# Patient Record
Sex: FEMALE | Race: WHITE
[De-identification: names, ages, dates, MRNs, and addresses within clinical notes are randomized per-mention and may not be internally consistent; named-entity substitution may affect disease eponyms.]

---

## 2017-01-18 PROBLEM — K90.89 BILE SALT-INDUCED DIARRHEA: Status: ACTIVE | Noted: 2017-01-18

## 2017-01-22 ENCOUNTER — HOSPITAL (OUTPATIENT)
Dept: OTHER | Age: 27
End: 2017-01-22
Attending: INTERNAL MEDICINE

## 2017-04-28 PROCEDURE — 87045 FECES CULTURE AEROBIC BACT: CPT | Performed by: INTERNAL MEDICINE

## 2017-04-28 PROCEDURE — 87269 GIARDIA AG IF: CPT | Performed by: INTERNAL MEDICINE

## 2017-04-28 PROCEDURE — 87015 SPECIMEN INFECT AGNT CONCNTJ: CPT | Performed by: INTERNAL MEDICINE

## 2017-04-28 PROCEDURE — 83993 ASSAY FOR CALPROTECTIN FECAL: CPT | Performed by: INTERNAL MEDICINE

## 2017-04-28 PROCEDURE — 87209 SMEAR COMPLEX STAIN: CPT | Performed by: INTERNAL MEDICINE

## 2017-04-28 PROCEDURE — 87493 C DIFF AMPLIFIED PROBE: CPT | Performed by: INTERNAL MEDICINE

## 2017-04-28 PROCEDURE — 87046 STOOL CULTR AEROBIC BACT EA: CPT | Performed by: INTERNAL MEDICINE

## 2017-04-28 PROCEDURE — 87427 SHIGA-LIKE TOXIN AG IA: CPT | Performed by: INTERNAL MEDICINE

## 2017-04-28 PROCEDURE — 87177 OVA AND PARASITES SMEARS: CPT | Performed by: INTERNAL MEDICINE

## 2017-06-22 ENCOUNTER — HOSPITAL ENCOUNTER (OUTPATIENT)
Dept: ULTRASOUND IMAGING | Facility: HOSPITAL | Age: 27
Discharge: HOME | End: 2017-06-22
Attending: OBSTETRICS & GYNECOLOGY
Payer: COMMERCIAL

## 2017-06-22 DIAGNOSIS — N94.5 SECONDARY DYSMENORRHEA: ICD-10-CM

## 2017-06-22 PROCEDURE — 76856 US EXAM PELVIC COMPLETE: CPT | Performed by: OBSTETRICS & GYNECOLOGY

## 2017-06-22 PROCEDURE — 76830 TRANSVAGINAL US NON-OB: CPT | Performed by: OBSTETRICS & GYNECOLOGY

## 2017-08-24 PROCEDURE — 87086 URINE CULTURE/COLONY COUNT: CPT | Performed by: PHYSICIAN ASSISTANT

## 2017-08-28 PROBLEM — K58.0 IRRITABLE BOWEL SYNDROME WITH DIARRHEA: Status: ACTIVE | Noted: 2017-08-28

## 2017-09-13 PROCEDURE — 87086 URINE CULTURE/COLONY COUNT: CPT | Performed by: FAMILY MEDICINE

## 2017-09-13 PROCEDURE — 81001 URINALYSIS AUTO W/SCOPE: CPT | Performed by: FAMILY MEDICINE

## 2019-04-30 PROCEDURE — 86900 BLOOD TYPING SEROLOGIC ABO: CPT | Performed by: OBSTETRICS & GYNECOLOGY

## 2019-04-30 PROCEDURE — 86901 BLOOD TYPING SEROLOGIC RH(D): CPT | Performed by: OBSTETRICS & GYNECOLOGY

## 2020-04-30 ENCOUNTER — HOSPITAL ENCOUNTER (INPATIENT)
Facility: HOSPITAL | Age: 30
LOS: 2 days | Discharge: HOME | End: 2020-05-02
Attending: OBSTETRICS & GYNECOLOGY | Admitting: OBSTETRICS & GYNECOLOGY
Payer: COMMERCIAL

## 2020-04-30 PROBLEM — O47.9 IRREGULAR CONTRACTIONS: Status: ACTIVE | Noted: 2020-04-30

## 2020-04-30 PROBLEM — Z34.90 TERM PREGNANCY: Status: ACTIVE | Noted: 2020-04-30

## 2020-04-30 PROCEDURE — 86901 BLOOD TYPING SEROLOGIC RH(D): CPT | Performed by: OBSTETRICS & GYNECOLOGY

## 2020-04-30 PROCEDURE — 85027 COMPLETE CBC AUTOMATED: CPT | Performed by: OBSTETRICS & GYNECOLOGY

## 2020-04-30 PROCEDURE — 86850 RBC ANTIBODY SCREEN: CPT | Performed by: OBSTETRICS & GYNECOLOGY

## 2020-04-30 PROCEDURE — 99214 OFFICE O/P EST MOD 30 MIN: CPT

## 2020-04-30 PROCEDURE — 86900 BLOOD TYPING SEROLOGIC ABO: CPT | Performed by: OBSTETRICS & GYNECOLOGY

## 2020-04-30 PROCEDURE — 0HQ9XZZ REPAIR PERINEUM SKIN, EXTERNAL APPROACH: ICD-10-PCS | Performed by: OBSTETRICS & GYNECOLOGY

## 2020-04-30 RX ORDER — DEXTROSE, SODIUM CHLORIDE, SODIUM LACTATE, POTASSIUM CHLORIDE, AND CALCIUM CHLORIDE 5; .6; .31; .03; .02 G/100ML; G/100ML; G/100ML; G/100ML; G/100ML
INJECTION, SOLUTION INTRAVENOUS AS NEEDED
Status: DISCONTINUED | OUTPATIENT
Start: 2020-04-30 | End: 2020-05-01 | Stop reason: HOSPADM

## 2020-04-30 RX ORDER — EPHEDRINE SULFATE/0.9% NACL/PF 25 MG/5 ML
5 SYRINGE (ML) INTRAVENOUS AS NEEDED
Status: DISCONTINUED | OUTPATIENT
Start: 2020-04-30 | End: 2020-05-01

## 2020-04-30 RX ORDER — SODIUM CHLORIDE 0.9 % (FLUSH) 0.9 %
2 SYRINGE (ML) INJECTION AS NEEDED
Status: DISCONTINUED | OUTPATIENT
Start: 2020-04-30 | End: 2020-05-01

## 2020-04-30 RX ORDER — AMMONIA INHALANTS 0.04 G/.3ML
0.3 INHALANT RESPIRATORY (INHALATION) AS NEEDED
Status: DISCONTINUED | OUTPATIENT
Start: 2020-04-30 | End: 2020-05-01 | Stop reason: HOSPADM

## 2020-04-30 RX ORDER — SODIUM CHLORIDE 0.9 % (FLUSH) 0.9 %
10 SYRINGE (ML) INJECTION AS NEEDED
Status: DISCONTINUED | OUTPATIENT
Start: 2020-04-30 | End: 2020-05-01 | Stop reason: HOSPADM

## 2020-04-30 RX ORDER — SODIUM CHLORIDE, SODIUM LACTATE, POTASSIUM CHLORIDE, CALCIUM CHLORIDE 600; 310; 30; 20 MG/100ML; MG/100ML; MG/100ML; MG/100ML
INJECTION, SOLUTION INTRAVENOUS CONTINUOUS
Status: DISCONTINUED | OUTPATIENT
Start: 2020-04-30 | End: 2020-05-01

## 2020-04-30 RX ORDER — DIPHENHYDRAMINE HYDROCHLORIDE 50 MG/ML
12.5 INJECTION INTRAMUSCULAR; INTRAVENOUS EVERY 4 HOURS PRN
Status: DISCONTINUED | OUTPATIENT
Start: 2020-04-30 | End: 2020-05-01

## 2020-04-30 RX ORDER — IBUPROFEN 600 MG/1
600 TABLET ORAL ONCE AS NEEDED
Status: DISCONTINUED | OUTPATIENT
Start: 2020-04-30 | End: 2020-05-01 | Stop reason: HOSPADM

## 2020-04-30 RX ORDER — TERBUTALINE SULFATE 1 MG/ML
0.25 INJECTION, SOLUTION SUBCUTANEOUS AS NEEDED
Status: DISCONTINUED | OUTPATIENT
Start: 2020-04-30 | End: 2020-05-01 | Stop reason: HOSPADM

## 2020-04-30 RX ORDER — ACETAMINOPHEN 500 MG
500 TABLET ORAL ONCE AS NEEDED
Status: DISCONTINUED | OUTPATIENT
Start: 2020-04-30 | End: 2020-05-01 | Stop reason: HOSPADM

## 2020-04-30 RX ORDER — BUPIVACAINE HYDROCHLORIDE 2.5 MG/ML
10 INJECTION, SOLUTION EPIDURAL; INFILTRATION; INTRACAUDAL ONCE
Status: DISCONTINUED | OUTPATIENT
Start: 2020-04-30 | End: 2020-05-01

## 2020-04-30 RX ORDER — PRENATAL VIT/IRON FUM/FOLIC AC 27 MG-1 MG
1 TABLET ORAL DAILY
COMMUNITY

## 2020-04-30 RX ORDER — TRISODIUM CITRATE DIHYDRATE AND CITRIC ACID MONOHYDRATE 500; 334 MG/5ML; MG/5ML
30 SOLUTION ORAL AS NEEDED
Status: DISCONTINUED | OUTPATIENT
Start: 2020-04-30 | End: 2020-05-01 | Stop reason: HOSPADM

## 2020-04-30 RX ORDER — LIDOCAINE HYDROCHLORIDE 10 MG/ML
30 INJECTION, SOLUTION EPIDURAL; INFILTRATION; INTRACAUDAL; PERINEURAL ONCE
Status: DISCONTINUED | OUTPATIENT
Start: 2020-04-30 | End: 2020-05-01 | Stop reason: HOSPADM

## 2020-04-30 RX ORDER — SODIUM CHLORIDE 0.9 % (FLUSH) 0.9 %
2 SYRINGE (ML) INJECTION EVERY 8 HOURS
Status: DISCONTINUED | OUTPATIENT
Start: 2020-04-30 | End: 2020-05-01

## 2020-04-30 RX ORDER — SODIUM CHLORIDE, SODIUM LACTATE, POTASSIUM CHLORIDE, CALCIUM CHLORIDE 600; 310; 30; 20 MG/100ML; MG/100ML; MG/100ML; MG/100ML
INJECTION, SOLUTION INTRAVENOUS CONTINUOUS
Status: DISCONTINUED | OUTPATIENT
Start: 2020-04-30 | End: 2020-05-01 | Stop reason: HOSPADM

## 2020-04-30 RX ORDER — ONDANSETRON 2 MG/ML
4 INJECTION INTRAMUSCULAR; INTRAVENOUS EVERY 6 HOURS PRN
Status: DISCONTINUED | OUTPATIENT
Start: 2020-04-30 | End: 2020-05-01

## 2020-04-30 RX ORDER — ONDANSETRON 2 MG/ML
INJECTION INTRAMUSCULAR; INTRAVENOUS
Status: COMPLETED
Start: 2020-04-30 | End: 2020-04-30

## 2020-04-30 NOTE — PROGRESS NOTES
Pt is a 30year old female admitted to TR3/TR3-A. Patient presents with:  Contractions     Pt is  39w6d intra-uterine pregnancy. History obtained, consents signed. Oriented to room, staff, and plan of care.

## 2020-05-01 PROCEDURE — 85025 COMPLETE CBC W/AUTO DIFF WBC: CPT | Performed by: OBSTETRICS & GYNECOLOGY

## 2020-05-01 RX ORDER — SODIUM CHLORIDE 0.9 % (FLUSH) 0.9 %
10 SYRINGE (ML) INJECTION AS NEEDED
Status: DISCONTINUED | OUTPATIENT
Start: 2020-05-01 | End: 2020-05-02

## 2020-05-01 RX ORDER — ACETAMINOPHEN 325 MG/1
650 TABLET ORAL EVERY 6 HOURS PRN
Status: DISCONTINUED | OUTPATIENT
Start: 2020-05-01 | End: 2020-05-02

## 2020-05-01 RX ORDER — SIMETHICONE 80 MG
80 TABLET,CHEWABLE ORAL 3 TIMES DAILY PRN
Status: DISCONTINUED | OUTPATIENT
Start: 2020-05-01 | End: 2020-05-02

## 2020-05-01 RX ORDER — DOCUSATE SODIUM 100 MG/1
100 CAPSULE, LIQUID FILLED ORAL
Status: DISCONTINUED | OUTPATIENT
Start: 2020-05-01 | End: 2020-05-02

## 2020-05-01 RX ORDER — IBUPROFEN 600 MG/1
600 TABLET ORAL EVERY 6 HOURS
Status: DISCONTINUED | OUTPATIENT
Start: 2020-05-01 | End: 2020-05-02

## 2020-05-01 RX ORDER — ONDANSETRON 2 MG/ML
4 INJECTION INTRAMUSCULAR; INTRAVENOUS EVERY 6 HOURS PRN
Status: DISCONTINUED | OUTPATIENT
Start: 2020-05-01 | End: 2020-05-02

## 2020-05-01 RX ORDER — DIAPER,BRIEF,INFANT-TODD,DISP
1 EACH MISCELLANEOUS EVERY 6 HOURS PRN
Status: DISCONTINUED | OUTPATIENT
Start: 2020-05-01 | End: 2020-05-02

## 2020-05-01 RX ORDER — AMMONIA INHALANTS 0.04 G/.3ML
0.3 INHALANT RESPIRATORY (INHALATION) AS NEEDED
Status: DISCONTINUED | OUTPATIENT
Start: 2020-05-01 | End: 2020-05-02

## 2020-05-01 RX ORDER — CHOLECALCIFEROL (VITAMIN D3) 25 MCG
1 TABLET,CHEWABLE ORAL DAILY
Status: DISCONTINUED | OUTPATIENT
Start: 2020-05-01 | End: 2020-05-02

## 2020-05-01 RX ORDER — BISACODYL 10 MG
10 SUPPOSITORY, RECTAL RECTAL ONCE AS NEEDED
Status: DISCONTINUED | OUTPATIENT
Start: 2020-05-01 | End: 2020-05-02

## 2020-05-01 NOTE — PROGRESS NOTES
Post-Partum Note   5/1/2020, 11:22 AM    Subjective:  PPD #1  No complaints. Lochia normal. Voiding normal. Not dizzy. Mood good.      Breast     Objective:   05/01/20  0030 05/01/20  0230 05/01/20  0625 05/01/20  0900   BP: 121/69 114/58 105/65 105/56   BP

## 2020-05-01 NOTE — LACTATION NOTE
LACTATION NOTE - MOTHER      Evaluation Type: Inpatient    Problems identified  Problems identified: Knowledge deficit    Maternal history  Maternal history: Anxiety;Depression  Other/comment: IBS, biliary dyskinesia, asthma, COPD    Breastfeeding goal  Br

## 2020-05-01 NOTE — H&P
[address] Patient Status:  Outpatient    1990 MRN [id number]   Location [address] [place] [address] Attending Pascual Vidal MD   Hosp Day # 0 PCP Shon De Jesus MD     Date cm    Effacement: 50 %    Station: -3    Position: Cephalic    Dominique score: 0    FHT assessment:   Baseline: 120 bpm   Variability: moderate   Accels:  Yes   Decels: lates -- episodic   Tocos:  q 2-4   Category: 2 tracing    Results:                Assess

## 2020-05-01 NOTE — DISCHARGE SUMMARY
[facility]    Discharge Summary    Tino Pearson Patient Status:  Outpatient    1990 MRN [id number]   Location [address]  Attending Enid Martin MD   [facility] Day # 2       Admission Date: 2020

## 2020-05-01 NOTE — PROGRESS NOTES
VSS. Lochia and fundus within normal limits. Unable to void before transfer. Bladder scanned 300 in bladder. Drinking PO fluids. Instructed to use call light to ask for assistance before getting out of bed. Oriented to room and unit. Breast feeding.  Discus

## 2020-05-01 NOTE — PLAN OF CARE
Problem: Patient/Family Goals  Goal: Patient/Family Long Term Goal  Description  Patient's Long Term Goal:     Interventions:  -   - See additional Care Plan goals for specific interventions  Outcome: Progressing  Goal: Patient/Family Short Term Goal  Margarita Brady efforts. - Assess support systems available to mother/family.  - Identify cultural beliefs/practices regarding lactation, letdown techniques, maternal food preferences.   - Assess mother's knowledge and previous experience with breast feeding.  - Provide i /case management support as needed.   Outcome: Progressing

## 2020-05-01 NOTE — L&D DELIVERY NOTE
[facility] HOSP - [facility]    Vaginal Delivery Note    Jil Grubbs Patient Status:  Outpatient    1990 MRN [id number]   Location [address]  Attending Meryle Raveling, MD   Hosp Day # 0 PCP MD Therese Vizcarra

## 2020-05-01 NOTE — LACTATION NOTE
Zahira Mccarthy LACTATION NOTE - MOTHER      Evaluation Type: Inpatient    Problems identified  Problems identified: Knowledge deficit; Nipple pain; Nipple trauma    Maternal history  Maternal history: Anxiety;Depression  Other/comment: IBS, biliary dyskinesia, asthma,

## 2020-05-01 NOTE — PROGRESS NOTES
Patient up to bathroom with assist x 2. Unable to void at this time. Patient transferred to mother/baby room 349 per wheelchair in stable condition with baby and personal belongings. Accompanied by significant other and staff.   Report given to mother/baby

## 2020-05-01 NOTE — PROGRESS NOTES
[facility]    Labor Progress Note    Stephanie Vazquez Patient Status:  Outpatient    1990 MRN [id number]   Location [address] [facility] Attending Celia Rivera MD   Hosp Day # 0 PCP MD Israel Bains

## 2020-05-02 VITALS
RESPIRATION RATE: 16 BRPM | WEIGHT: 155 LBS | BODY MASS INDEX: 27.46 KG/M2 | SYSTOLIC BLOOD PRESSURE: 119 MMHG | TEMPERATURE: 98 F | HEART RATE: 97 BPM | HEIGHT: 63 IN | DIASTOLIC BLOOD PRESSURE: 78 MMHG

## 2020-05-02 PROBLEM — Z86.59 HISTORY OF ANXIETY: Status: ACTIVE | Noted: 2020-05-02

## 2020-05-02 NOTE — PROGRESS NOTES
Post-Partum Note   5/2/2020, 1:01 PM    Subjective:  PPD 2  No complaints. Lochia normal. Voiding normal. Not dizzy. Mood good.        Objective:   05/01/20  0900 05/01/20  1828 05/01/20  2315 05/02/20  1000   BP: 105/56 104/73 105/67 119/78   BP Location:

## 2020-05-05 ENCOUNTER — TELEPHONE (OUTPATIENT)
Dept: LACTATION | Facility: HOSPITAL | Age: 30
End: 2020-05-05

## 2020-05-05 NOTE — TELEPHONE ENCOUNTER
This note is being written on 5/520; original note from 5/3/20 1830 was lost. Returned patient's call to lactation support services at ~ 1815. Patient reported she believed her breastmilk \"is in\" and her \"breasts are hard\" and wanted assistance.  Kamlesh

## 2020-05-06 ENCOUNTER — TELEPHONE (OUTPATIENT)
Dept: OBGYN UNIT | Facility: HOSPITAL | Age: 30
End: 2020-05-06

## 2020-05-09 ENCOUNTER — TELEPHONE (OUTPATIENT)
Dept: LACTATION | Facility: HOSPITAL | Age: 30
End: 2020-05-09

## 2020-05-09 ENCOUNTER — TELEPHONE (OUTPATIENT)
Dept: OBGYN UNIT | Facility: HOSPITAL | Age: 30
End: 2020-05-09

## 2021-12-01 ENCOUNTER — HOSPITAL ENCOUNTER (INPATIENT)
Facility: HOSPITAL | Age: 31
LOS: 2 days | Discharge: HOME | End: 2021-12-03
Attending: OBSTETRICS & GYNECOLOGY | Admitting: OBSTETRICS & GYNECOLOGY
Payer: COMMERCIAL

## 2021-12-01 PROBLEM — O42.90 PREMATURE RUPTURE OF MEMBRANES: Status: ACTIVE | Noted: 2021-12-01

## 2021-12-01 PROCEDURE — 86900 BLOOD TYPING SEROLOGIC ABO: CPT | Performed by: OBSTETRICS & GYNECOLOGY

## 2021-12-01 PROCEDURE — 86850 RBC ANTIBODY SCREEN: CPT | Performed by: OBSTETRICS & GYNECOLOGY

## 2021-12-01 PROCEDURE — 85025 COMPLETE CBC W/AUTO DIFF WBC: CPT | Performed by: OBSTETRICS & GYNECOLOGY

## 2021-12-01 PROCEDURE — 86901 BLOOD TYPING SEROLOGIC RH(D): CPT | Performed by: OBSTETRICS & GYNECOLOGY

## 2021-12-01 RX ORDER — ACETAMINOPHEN 500 MG
500 TABLET ORAL EVERY 6 HOURS PRN
Status: DISCONTINUED | OUTPATIENT
Start: 2021-12-01 | End: 2021-12-02

## 2021-12-01 RX ORDER — TERBUTALINE SULFATE 1 MG/ML
0.25 INJECTION, SOLUTION SUBCUTANEOUS AS NEEDED
Status: DISCONTINUED | OUTPATIENT
Start: 2021-12-01 | End: 2021-12-02

## 2021-12-01 RX ORDER — TRISODIUM CITRATE DIHYDRATE AND CITRIC ACID MONOHYDRATE 500; 334 MG/5ML; MG/5ML
30 SOLUTION ORAL AS NEEDED
Status: DISCONTINUED | OUTPATIENT
Start: 2021-12-01 | End: 2021-12-02

## 2021-12-01 RX ORDER — AMMONIA INHALANTS 0.04 G/.3ML
0.3 INHALANT RESPIRATORY (INHALATION) AS NEEDED
Status: DISCONTINUED | OUTPATIENT
Start: 2021-12-01 | End: 2021-12-02

## 2021-12-01 RX ORDER — IBUPROFEN 600 MG/1
600 TABLET ORAL EVERY 6 HOURS PRN
Status: DISCONTINUED | OUTPATIENT
Start: 2021-12-01 | End: 2021-12-02

## 2021-12-01 RX ORDER — SODIUM CHLORIDE, SODIUM LACTATE, POTASSIUM CHLORIDE, CALCIUM CHLORIDE 600; 310; 30; 20 MG/100ML; MG/100ML; MG/100ML; MG/100ML
INJECTION, SOLUTION INTRAVENOUS AS NEEDED
Status: DISCONTINUED | OUTPATIENT
Start: 2021-12-01 | End: 2021-12-02

## 2021-12-01 RX ORDER — ONDANSETRON 2 MG/ML
4 INJECTION INTRAMUSCULAR; INTRAVENOUS EVERY 6 HOURS PRN
Status: DISCONTINUED | OUTPATIENT
Start: 2021-12-01 | End: 2021-12-02

## 2021-12-01 RX ORDER — MELATONIN
325
COMMUNITY
End: 2021-12-03

## 2021-12-01 RX ORDER — DEXTROSE, SODIUM CHLORIDE, SODIUM LACTATE, POTASSIUM CHLORIDE, AND CALCIUM CHLORIDE 5; .6; .31; .03; .02 G/100ML; G/100ML; G/100ML; G/100ML; G/100ML
INJECTION, SOLUTION INTRAVENOUS CONTINUOUS
Status: DISCONTINUED | OUTPATIENT
Start: 2021-12-01 | End: 2021-12-02

## 2021-12-01 RX ORDER — LIDOCAINE HYDROCHLORIDE 10 MG/ML
30 INJECTION, SOLUTION EPIDURAL; INFILTRATION; INTRACAUDAL; PERINEURAL ONCE
Status: DISCONTINUED | OUTPATIENT
Start: 2021-12-01 | End: 2021-12-02

## 2021-12-02 PROCEDURE — 0KQM0ZZ REPAIR PERINEUM MUSCLE, OPEN APPROACH: ICD-10-PCS | Performed by: OBSTETRICS & GYNECOLOGY

## 2021-12-02 RX ORDER — BISACODYL 10 MG
10 SUPPOSITORY, RECTAL RECTAL ONCE AS NEEDED
Status: DISCONTINUED | OUTPATIENT
Start: 2021-12-02 | End: 2021-12-03

## 2021-12-02 RX ORDER — ONDANSETRON 2 MG/ML
4 INJECTION INTRAMUSCULAR; INTRAVENOUS EVERY 6 HOURS PRN
Status: DISCONTINUED | OUTPATIENT
Start: 2021-12-02 | End: 2021-12-03

## 2021-12-02 RX ORDER — SIMETHICONE 80 MG
80 TABLET,CHEWABLE ORAL 3 TIMES DAILY PRN
Status: DISCONTINUED | OUTPATIENT
Start: 2021-12-02 | End: 2021-12-03

## 2021-12-02 RX ORDER — DOCUSATE SODIUM 100 MG/1
100 CAPSULE, LIQUID FILLED ORAL 2 TIMES DAILY
Status: DISCONTINUED | OUTPATIENT
Start: 2021-12-02 | End: 2021-12-03

## 2021-12-02 RX ORDER — IBUPROFEN 600 MG/1
600 TABLET ORAL EVERY 6 HOURS
Status: DISCONTINUED | OUTPATIENT
Start: 2021-12-02 | End: 2021-12-03

## 2021-12-02 RX ORDER — DIAPER,BRIEF,INFANT-TODD,DISP
1 EACH MISCELLANEOUS EVERY 6 HOURS PRN
Status: DISCONTINUED | OUTPATIENT
Start: 2021-12-02 | End: 2021-12-03

## 2021-12-02 RX ORDER — ACETAMINOPHEN 325 MG/1
650 TABLET ORAL EVERY 6 HOURS PRN
Status: DISCONTINUED | OUTPATIENT
Start: 2021-12-02 | End: 2021-12-03

## 2021-12-02 RX ORDER — AMMONIA INHALANTS 0.04 G/.3ML
0.3 INHALANT RESPIRATORY (INHALATION) AS NEEDED
Status: DISCONTINUED | OUTPATIENT
Start: 2021-12-02 | End: 2021-12-03

## 2021-12-02 NOTE — PROGRESS NOTES
Labor Progress Note    Pt feeling more pressure and discomfort s/p Pit AOL started due to lack of change and UCs less freq/strong    AFVSS  Exam deferred    FHTs mod variability, variable decels w UCs  UC q 2-3 min w Pit @ 6 mU/min    CPM    SHAY GAGE,

## 2021-12-02 NOTE — H&P
[address] [place] Patient Status:  Inpatient    1990 MRN [id number]   Location [facility] Attending Jhoan Gutierrez MD   Hosp Day # 1 PCP Katie Deleon MD     Date of A [id number];  Surgeon: Aki Lee MD;  Location: [address] [place] [address]     Family History: No family history on file.  Noncontrib  Social History: Social History    Tobacco Use      Smoking status: Never Smoker      Smokeless tobacco: Never Used    Alco 11/23/2021 Negative Negative Final   B+, Ab scrn neg, Rub-Imm, HBsAg NR, 11/23 HIV and RPR neg.        Lab brief 24hr:  Lab Results   Component Value Date    WBC 10.8 12/01/2021    HGB 11.7 12/01/2021    HCT 34.8 12/01/2021    .0 12/01/2021

## 2021-12-02 NOTE — DISCHARGE SUMMARY
[facility]    Discharge Summary    Arnoldo Aponte Patient Status:  Inpatient    1990 MRN [id number]   Location [address]  Attending Raymon Tyson MD   [facility] Day # 1       Admission Date: 2021  Disc

## 2021-12-02 NOTE — PROGRESS NOTES
Received patient from [facility]. ID bands verified and unit orientation discussed and plan of care agreed on. Vitals are stable and bleeding WNL. Patient is breastfeeding only and breastfeeding well. All questions answered. Family at the bedside.

## 2021-12-02 NOTE — LACTATION NOTE
This note was copied from a baby's chart.   LACTATION NOTE - INFANT    Evaluation Type  Evaluation Type: Inpatient    Problems & Assessment  Infant Assessment: Minimal hunger cues present  Muscle tone: Appropriate for GA    Feeding Assessment  Summary Edwin Bethea

## 2021-12-02 NOTE — L&D DELIVERY NOTE
[facility] HOSP - [facility]    Vaginal Delivery Note    Bety Palmer Patient Status:  Inpatient    1990 MRN [id number]   Location [address]  Attending Ashley Potter MD   Hosp Day # 1 PCP MD Nena Villa

## 2021-12-02 NOTE — LACTATION NOTE
LACTATION NOTE - MOTHER      Evaluation Type: Inpatient    Problems identified  Problems identified: Knowledge deficit    Maternal history  Maternal history: Anxiety  Other/comment: IBS, biliary dyskinesia, asthma, COPD    Breastfeeding goal  Breastfeeding

## 2021-12-02 NOTE — PROGRESS NOTES
Pt is a 33year old female admitted to [facility]. Patient presents with:  Laboring     Pt is  38w0d intra-uterine pregnancy. History obtained, consents signed. Oriented to room, staff, and plan of care.

## 2021-12-03 VITALS
RESPIRATION RATE: 17 BRPM | SYSTOLIC BLOOD PRESSURE: 109 MMHG | HEIGHT: 63 IN | HEART RATE: 86 BPM | BODY MASS INDEX: 29.41 KG/M2 | WEIGHT: 166 LBS | DIASTOLIC BLOOD PRESSURE: 66 MMHG | TEMPERATURE: 98 F

## 2021-12-03 PROCEDURE — 85027 COMPLETE CBC AUTOMATED: CPT | Performed by: OBSTETRICS & GYNECOLOGY

## 2021-12-03 NOTE — PROGRESS NOTES
Post-Partum Note   12/3/2021, 11:43 AM    Subjective:  PPD #1  No complaints. Lochia normal. Voiding normal. Not dizzy. Mood good.    Breast     Objective:   12/02/21  1500 12/02/21  1826 12/02/21  2100 12/03/21  0819   BP: 113/65 112/68 115/72 109/66   BP

## 2021-12-17 ENCOUNTER — TELEPHONE (OUTPATIENT)
Dept: OBGYN UNIT | Facility: HOSPITAL | Age: 31
End: 2021-12-17

## 2023-02-06 ENCOUNTER — OFFICE VISIT (OUTPATIENT)
Dept: OBGYN | Age: 33
End: 2023-02-06

## 2023-02-06 VITALS
WEIGHT: 121.25 LBS | BODY MASS INDEX: 21.48 KG/M2 | HEIGHT: 63 IN | DIASTOLIC BLOOD PRESSURE: 73 MMHG | SYSTOLIC BLOOD PRESSURE: 107 MMHG | HEART RATE: 76 BPM | TEMPERATURE: 97.9 F

## 2023-02-06 DIAGNOSIS — Z01.419 ENCOUNTER FOR WELL WOMAN EXAM WITH ROUTINE GYNECOLOGICAL EXAM: Primary | ICD-10-CM

## 2023-02-06 PROCEDURE — 99395 PREV VISIT EST AGE 18-39: CPT | Performed by: OBSTETRICS & GYNECOLOGY

## (undated) NOTE — LETTER
[facility]  Administration of Anesthesia  1. I, Cesar Quintanilla, or _________________________________ acting on her behalf, (Patient) (Dependent/Representative) request to receive anesthesia for my pending procedure/operation/treatment.   A bleeding, seizure, cardiac arrest and death. 7. AWARENESS: I understand that it is possible (but unlikely) to have explicit memory of events from the operating room while under general anesthesia.   8. ELECTROCONVULSIVE THERAPY PATIENTS: This consent serve below affirms that prior to the time of the procedure, I have explained to the patient and/or his/her guardian, the risks and benefits of undergoing anesthesia, as well as any reasonable alternatives.     ___________________________________________________

## (undated) NOTE — LETTER
Dear new mom:    Sorry, we missed you! The nurses of [facility] have tried to reach you by phone to ask if you have any questions regarding your health or the health and care of your new little one.     We hope you are doing moms and their babies (up to 7 months of age). Relatives and friends are welcome to attend with the new mom. Includes breastfeeding support with an IBCLC (lactation consultant) who is available to answer your breastfeeding questions.     Mom & Baby Hour (El go to [email or web address] or call [phone].  Fitness at [address]: [phone]  • [place]: [phone]    Facebook Groups  [employer] Moms—Celebrating motherhood.  For expec